# Patient Record
Sex: FEMALE | Race: WHITE | Employment: OTHER | URBAN - METROPOLITAN AREA
[De-identification: names, ages, dates, MRNs, and addresses within clinical notes are randomized per-mention and may not be internally consistent; named-entity substitution may affect disease eponyms.]

---

## 2024-08-20 ENCOUNTER — TELEPHONE (OUTPATIENT)
Dept: FAMILY MEDICINE CLINIC | Facility: CLINIC | Age: 74
End: 2024-08-20

## 2024-08-20 RX ORDER — OMEPRAZOLE 40 MG/1
CAPSULE, DELAYED RELEASE ORAL EVERY MORNING
COMMUNITY

## 2024-08-20 RX ORDER — ALPRAZOLAM 0.25 MG
1 TABLET ORAL NIGHTLY PRN
COMMUNITY
Start: 2023-06-29

## 2024-08-20 RX ORDER — TURMERIC 100 %
1 POWDER (GRAM) MISCELLANEOUS
COMMUNITY
End: 2024-08-21

## 2024-08-20 RX ORDER — MELOXICAM 15 MG/1
15 TABLET ORAL DAILY
COMMUNITY
Start: 2023-06-25

## 2024-08-20 RX ORDER — NAPROXEN SODIUM 220 MG
220 TABLET ORAL 2 TIMES DAILY WITH MEALS
COMMUNITY
End: 2024-08-21

## 2024-08-20 RX ORDER — AMLODIPINE BESYLATE 5 MG/1
5 TABLET ORAL DAILY
COMMUNITY
Start: 2023-07-21

## 2024-08-20 RX ORDER — PRAVASTATIN SODIUM 40 MG
40 TABLET ORAL
COMMUNITY
Start: 2024-08-07

## 2024-08-20 RX ORDER — LISINOPRIL 20 MG/1
20 TABLET ORAL DAILY
COMMUNITY
Start: 2023-06-27

## 2024-08-20 RX ORDER — MONTELUKAST SODIUM 10 MG/1
10 TABLET ORAL DAILY
COMMUNITY
Start: 2023-05-23

## 2024-08-20 NOTE — TELEPHONE ENCOUNTER
RTKA on 09/10/24 with Dr. Scruggs @     H&P- completed 08/21/2024 with Dr. Oscar Mayes  Labs- CBC and CMP done 08/02/24- WNL  EKG- NSR; cannot rule out anterior infarct, age undetermined. Abnormal ECG. Previous Stress Echo in Care Everywhere done 04/10/19. Stress Test Conclusions:   1. Normal echocardiographic response to exercise stress without evidence of ischemia at the heart rate attained.   2. Normal (%) age expected functional aerobic capacity.   3. The heart rate and blood pressure responses to exercise were normal.   4. There were no significant arrhythmias noted.   5. There were no symptoms of ischemia reported.   6. The electrocardiogram during exercise stress showed no evidence of myocardial ischemia.   X-ray- WNL

## 2024-08-21 ENCOUNTER — HOSPITAL ENCOUNTER (OUTPATIENT)
Dept: GENERAL RADIOLOGY | Facility: HOSPITAL | Age: 74
Discharge: HOME | End: 2024-08-21
Attending: FAMILY MEDICINE
Payer: MEDICARE

## 2024-08-21 ENCOUNTER — OFFICE VISIT (OUTPATIENT)
Dept: FAMILY MEDICINE CLINIC | Facility: CLINIC | Age: 74
End: 2024-08-21
Payer: MEDICARE

## 2024-08-21 ENCOUNTER — LAB ENCOUNTER (OUTPATIENT)
Dept: LAB | Facility: HOSPITAL | Age: 74
End: 2024-08-21
Attending: FAMILY MEDICINE
Payer: MEDICARE

## 2024-08-21 VITALS
WEIGHT: 186 LBS | HEART RATE: 89 BPM | OXYGEN SATURATION: 95 % | DIASTOLIC BLOOD PRESSURE: 72 MMHG | TEMPERATURE: 98 F | SYSTOLIC BLOOD PRESSURE: 126 MMHG | HEIGHT: 61 IN | RESPIRATION RATE: 16 BRPM | BODY MASS INDEX: 35.12 KG/M2

## 2024-08-21 DIAGNOSIS — E66.09 CLASS 2 OBESITY DUE TO EXCESS CALORIES WITHOUT SERIOUS COMORBIDITY WITH BODY MASS INDEX (BMI) OF 35.0 TO 35.9 IN ADULT: ICD-10-CM

## 2024-08-21 DIAGNOSIS — M17.11 ARTHRITIS OF RIGHT KNEE: ICD-10-CM

## 2024-08-21 DIAGNOSIS — E78.00 HIGH CHOLESTEROL: ICD-10-CM

## 2024-08-21 DIAGNOSIS — Z01.812 PRE-OPERATIVE LABORATORY EXAMINATION: ICD-10-CM

## 2024-08-21 DIAGNOSIS — I10 PRIMARY HYPERTENSION: ICD-10-CM

## 2024-08-21 DIAGNOSIS — Z01.818 PREOP EXAMINATION: ICD-10-CM

## 2024-08-21 DIAGNOSIS — Z01.818 PREOP EXAMINATION: Primary | ICD-10-CM

## 2024-08-21 LAB
ATRIAL RATE: 85 BPM
BILIRUB UR QL: NEGATIVE
CLARITY UR: CLEAR
GLUCOSE UR-MCNC: NORMAL MG/DL
HGB UR QL STRIP.AUTO: NEGATIVE
KETONES UR-MCNC: NEGATIVE MG/DL
LEUKOCYTE ESTERASE UR QL STRIP.AUTO: NEGATIVE
NITRITE UR QL STRIP.AUTO: NEGATIVE
P AXIS: 47 DEGREES
P-R INTERVAL: 168 MS
PH UR: 6 [PH] (ref 5–8)
PROT UR-MCNC: NEGATIVE MG/DL
Q-T INTERVAL: 370 MS
QRS DURATION: 72 MS
QTC CALCULATION (BEZET): 440 MS
R AXIS: 6 DEGREES
SP GR UR STRIP: 1.01 (ref 1–1.03)
T AXIS: 52 DEGREES
UROBILINOGEN UR STRIP-ACNC: NORMAL
VENTRICULAR RATE: 85 BPM

## 2024-08-21 PROCEDURE — 99203 OFFICE O/P NEW LOW 30 MIN: CPT | Performed by: FAMILY MEDICINE

## 2024-08-21 PROCEDURE — 93010 ELECTROCARDIOGRAM REPORT: CPT | Performed by: INTERNAL MEDICINE

## 2024-08-21 PROCEDURE — 87081 CULTURE SCREEN ONLY: CPT

## 2024-08-21 PROCEDURE — 81003 URINALYSIS AUTO W/O SCOPE: CPT

## 2024-08-21 PROCEDURE — 93005 ELECTROCARDIOGRAM TRACING: CPT

## 2024-08-21 PROCEDURE — 71046 X-RAY EXAM CHEST 2 VIEWS: CPT | Performed by: FAMILY MEDICINE

## 2024-08-21 RX ORDER — ACETAMINOPHEN 325 MG/1
650 TABLET ORAL 2 TIMES DAILY
COMMUNITY

## 2024-08-21 NOTE — TELEPHONE ENCOUNTER
Dr. Mayes, please review:    Labs- CBC and CMP done 08/02/24- WNL, MRSA neg, UA neg    EKG- NSR; cannot rule out anterior infarct, age undetermined. Abnormal ECG. Previous Stress Echo in Care Everywhere done 04/10/19. Stress Test Conclusions:   1. Normal echocardiographic response to exercise stress without evidence of ischemia at the heart rate attained.   2. Normal (%) age expected functional aerobic capacity.   3. The heart rate and blood pressure responses to exercise were normal.   4. There were no significant arrhythmias noted.   5. There were no symptoms of ischemia reported.   6. The electrocardiogram during exercise stress showed no evidence of myocardial ischemia.     X-ray- WNL    OK for surgery?

## 2024-08-22 NOTE — TELEPHONE ENCOUNTER
Chart and results reviewed and are acceptable for surgery. Pt is medically clear to proceed with surgery as planned.      ECG tracing reviewed and does not show evidence of anterior MI. Normal ECG

## 2024-08-22 NOTE — H&P
[facility] PRE-OP CLINIC [place]    PRE-OP NOTE    HPI:   I have been consulted by Dr. Scruggs to see Vanita Bauer 74 year old female for a preoperative evaluation and medical clearance. Vanita has a long history of worsening severe degenerative arthritis right knee. Patient is to have right TKA by Dr. Scruggs on 9/10/24.     Pt suffers significant pain and loss of function.     No cardiopulmonary symptoms. Normal stress echo in 2019     No history of VIPIN or DVT. Denies tobacco use.       Family History   Problem Relation Age of Onset    Cancer Father         liver    Cancer Mother         lung      Current Outpatient Medications   Medication Sig Dispense Refill    acetaminophen (TYLENOL) 325 MG Oral Tab Take 2 tablets (650 mg total) by mouth in the morning and 2 tablets (650 mg total) before bedtime.      pravastatin 40 MG Oral Tab Take 1 tablet (40 mg total) by mouth Before Dinner.      Omeprazole 40 MG Oral Capsule Delayed Release Take by mouth every morning.      montelukast 10 MG Oral Tab Take 1 tablet (10 mg total) by mouth daily.      Meloxicam 15 MG Oral Tab Take 1 tablet (15 mg total) by mouth daily.      lisinopril 20 MG Oral Tab Take 1 tablet (20 mg total) by mouth daily.      Cholecalciferol 25 MCG (1000 UT) Oral Tab Take 1 tablet (1,000 Units total) by mouth daily.      CALCIUM OR Take 500 mg by mouth daily.      amLODIPine 5 MG Oral Tab Take 1 tablet (5 mg total) by mouth daily.      ALPRAZolam 0.25 MG Oral Tab Take 1 tablet (0.25 mg total) by mouth nightly as needed.      Albuterol Sulfate 108 (90 Base) MCG/ACT Inhalation Aerosol Powder, Breath Activated Inhale 2 puffs into the lungs 4 (four) times daily as needed.       Past Medical History:    Anxiety    due to spouse passing    Asthma (HCC)    Esophageal reflux    HLD (hyperlipidemia)    HTN (hypertension)    Osteoarthritis    Sleep apnea    uses CPAP    Visual impairment    glasses     Past Surgical History:   Procedure Laterality Date     Bunionectomy Bilateral     Cataract Bilateral     Tonsillectomy       Social History     Socioeconomic History    Marital status: Single     Spouse name: Not on file    Number of children: Not on file    Years of education: Not on file    Highest education level: Not on file   Occupational History    Not on file   Tobacco Use    Smoking status: Former     Types: Cigarettes     Passive exposure: Never    Smokeless tobacco: Never   Vaping Use    Vaping status: Never Used   Substance and Sexual Activity    Alcohol use: Yes     Comment: 1 glass of wine 3-4 times weeky    Drug use: Never    Sexual activity: Not on file   Other Topics Concern    Not on file   Social History Narrative    Not on file     Social Determinants of Health     Financial Resource Strain: Not on file   Food Insecurity: Low Risk  (8/6/2024)    Received from [facility]    Food Insecurity     Have there been times that your food ran out, and you didn't have money to get more?: No     Are there times that you worry that this might happen?: No   Transportation Needs: Low Risk  (8/6/2024)    Received from [facility]    Transportation Needs     Do you have trouble getting transportation to medical appointments?: No     How do you normally get to and from your appointments?: Other   Physical Activity: Not on file   Stress: Not on file   Social Connections: Not on file   Housing Stability: Not on file (8/6/2024)       REVIEW OF SYSTEMS:   CONSTITUTIONAL:  Denies unusual weight gain/loss, fever, chills  EENT:  Eyes:  Denies eye pain, visual loss, blurred vision, double vision. Ears, Nose, Throat:  Denies congestion, runny nose or sore throat.  INTEGUMENTARY:  Denies rashes, itching, skin lesion,   CARDIOVASCULAR:  Denies DVT. Denies chest pain, palpitations, edema, dyspnea  RESPIRATORY:  no VIPIN ,Denies shortness of breath, wheezing, cough  GASTROINTESTINAL:  Denies abdominal pain, nausea, vomiting, constipation,  diarrhea, or blood in stool.  MUSCULOSKELETAL: severe pain as noted above  NEUROLOGICAL:  Denies headache, seizures, dizziness, syncope, paralysis, ataxia,  HEMATOLOGIC:  Denies anemia, bleeding or bruising.  LYMPHATICS:  Denies enlarged nodes   PSYCHIATRIC:  Denies depression or anxiety.  ENDOCRINOLOGIC: DM 2 no,   ALLERGIES:  Denies allergic response, history of asthma, hives,     EXAM:   /72 (BP Location: Left arm)   Pulse 89   Temp 98 °F (36.7 °C) (Temporal)   Resp 16   Ht 5' 1\" (1.549 m)   Wt 186 lb (84.4 kg)   SpO2 95%   BMI 35.14 kg/m²  Estimated body mass index is 35.14 kg/m² as calculated from the following:    Height as of this encounter: 5' 1\" (1.549 m).    Weight as of this encounter: 186 lb (84.4 kg).   Vital signs reviewed.Appears stated age, well groomed.  Physical Exam:  GEN:  Patient is alert, awake and oriented, well developed, well nourished, no apparent distress.  HEENT:  Head:  Normocephalic, atraumatic Eyes: EOMI,no scleral icterus, conjunctivae clear bilaterally, no eye discharge Nose: patent, no nasal discharge  NECK: Supple, no CLAD, no carotid bruit, no thyromegaly.  SKIN: No rashes, no skin lesion, no bruising, good turgor.  HEART:  Regular rate and rhythm, no murmurs, rubs or gallops.  LUNGS: Clear to auscultation bilterally, no rales/rhonchi/wheezing.  CHEST: No tenderness.  ABDOMEN:  Soft, nondistended, nontender,  no masses, no hepatosplenomegaly.  BACK: No tenderness, no spasm,   EXTREMITIES:  No edema, no cyanosis, no clubbing,   NEURO:  No focal deficit, speech fluent, normal gait, strength and tone, sensory intact    No results found.    EKG 12 Lead    Result Date: 8/21/2024  Normal sinus rhythm Cannot rule out Anterior infarct , age undetermined Abnormal ECG No previous ECGs found in [facility] Confirmed by Rangel Rosado ([id number]) on 8/21/2024 3:31:04 PM     ASSESSMENT AND PLAN:   Vanita Bauer is a 74 year old female, with a hx of severe degenerative arthritis right knee  who presents for a pre-operative physical exam. Patient is to have right TKA by Dr. Scruggs on 9/10/24.      Patient Active Problem List   Diagnosis    Arthritis of right knee    Primary hypertension    High cholesterol    Class 2 obesity due to excess calories without serious comorbidity in adult        ECG reviewed and is normal with out evidence of anterior MI    Preoperative Risk Stratification: There are no decompensated medical conditions. ASA classification   Patient has an RCRI score that  low risk for major cardiac event in the perioperative period.     Patient is medically optimized and has an acceptable risk of surgery and may proceed with surgery as planned.     PLAN:    Patient to discontinue medications and supplements with anticoagulation properties as per instruction.       Postoperative Recommendations:    Anticoagulation / DVT prophylaxis: SCDs, early ambulation. ASA 81 mg twice daily with food for four weeks.    GI protection: Protonix  Incentive Spirometry   Telemetry as needed      Pain management and Physical therapy as per Orthopedic service.   Home Health as needed    Thank you for the opportunity to care for your patient and to assist in managing the postoperative course.        Oscar Mayes MD  8/21/2024  10:27 PM

## 2024-08-23 NOTE — TELEPHONE ENCOUNTER
ABNER for patient with test results and that she is clear for surgery per Dr. Mayes.    Pre-op packet faxed to [facility]   Duration Of Freeze Thaw-Cycle (Seconds): 10 Consent: The patient's consent was obtained including but not limited to risks of crusting, scabbing, blistering, scarring, darker or lighter pigmentary change, recurrence, incomplete removal and infection. Render Note In Bullet Format When Appropriate: No Detail Level: Simple Number Of Freeze-Thaw Cycles: 2 freeze-thaw cycles Show Aperture Variable?: Yes Post-Care Instructions: I reviewed with the patient in detail post-care instructions. Patient is to wear sunprotection, and avoid picking at any of the treated lesions. Pt may apply Vaseline to crusted or scabbing areas.